# Patient Record
Sex: MALE | Race: WHITE | NOT HISPANIC OR LATINO | Employment: PART TIME | URBAN - METROPOLITAN AREA
[De-identification: names, ages, dates, MRNs, and addresses within clinical notes are randomized per-mention and may not be internally consistent; named-entity substitution may affect disease eponyms.]

---

## 2019-02-07 ENCOUNTER — HOSPITAL ENCOUNTER (EMERGENCY)
Facility: HOSPITAL | Age: 39
Discharge: HOME | End: 2019-02-07
Attending: EMERGENCY MEDICINE
Payer: MEDICAID

## 2019-02-07 VITALS
DIASTOLIC BLOOD PRESSURE: 85 MMHG | SYSTOLIC BLOOD PRESSURE: 117 MMHG | RESPIRATION RATE: 20 BRPM | OXYGEN SATURATION: 98 % | TEMPERATURE: 98 F | HEART RATE: 85 BPM

## 2019-02-07 DIAGNOSIS — R52 PAIN: ICD-10-CM

## 2019-02-07 DIAGNOSIS — S93.402A SPRAIN OF LEFT ANKLE, UNSPECIFIED LIGAMENT, INITIAL ENCOUNTER: Primary | ICD-10-CM

## 2019-02-07 PROCEDURE — 25000003 PHARM REV CODE 250: Mod: ER | Performed by: PHYSICIAN ASSISTANT

## 2019-02-07 PROCEDURE — 99283 EMERGENCY DEPT VISIT LOW MDM: CPT | Mod: ER

## 2019-02-07 RX ORDER — KETOROLAC TROMETHAMINE 10 MG/1
10 TABLET, FILM COATED ORAL EVERY 6 HOURS
Qty: 12 TABLET | Refills: 0 | Status: SHIPPED | OUTPATIENT
Start: 2019-02-07 | End: 2019-02-10

## 2019-02-07 RX ORDER — IBUPROFEN 600 MG/1
600 TABLET ORAL
Status: COMPLETED | OUTPATIENT
Start: 2019-02-07 | End: 2019-02-07

## 2019-02-07 RX ADMIN — IBUPROFEN 600 MG: 600 TABLET, FILM COATED ORAL at 07:02

## 2019-02-14 NOTE — ED PROVIDER NOTES
Encounter Date: 2/7/2019       History     Chief Complaint   Patient presents with    Ankle Pain     left ankle pain after stepping wrong while at work today.      Jonny Garza 38 y.o. male presented to the ED with c/o left ankle pain that began earlier today.  Patient reports that he had a mechanical fall while work causing foot inversion.  He states that he began with immediate swelling and pain to the lateral aspect of the ankle.  He states that this pain is exacerbated by palpation and certain movements.  He states that he has been unable to bear full weight on affected left lower extremity secondary to pain.        The history is provided by the patient.     Review of patient's allergies indicates:  No Known Allergies  History reviewed. No pertinent past medical history.  History reviewed. No pertinent surgical history.  History reviewed. No pertinent family history.  Social History     Tobacco Use    Smoking status: Current Every Day Smoker     Packs/day: 1.00    Smokeless tobacco: Never Used   Substance Use Topics    Alcohol use: No    Drug use: Yes     Types: Marijuana     Review of Systems   Musculoskeletal: Positive for arthralgias, gait problem and joint swelling. Negative for back pain.   Skin: Negative for rash.   Neurological: Negative for weakness and numbness.   Hematological: Does not bruise/bleed easily.       Physical Exam     Initial Vitals [02/07/19 2120]   BP Pulse Resp Temp SpO2   117/85 85 20 98.3 °F (36.8 °C) 98 %      MAP       --         Physical Exam    Nursing note and vitals reviewed.  Constitutional: Vital signs are normal. He appears well-developed and well-nourished. He is cooperative.  Non-toxic appearance. He does not appear ill.   HENT:   Head: Normocephalic and atraumatic.   Eyes: Conjunctivae and lids are normal.   Neck: Normal range of motion.   Cardiovascular: Normal rate.   Pulmonary/Chest: No respiratory distress.   Abdominal: Normal appearance.   Musculoskeletal:         Left ankle: He exhibits decreased range of motion and swelling. He exhibits no ecchymosis and normal pulse. Tenderness. Lateral malleolus and AITFL tenderness found. No head of 5th metatarsal tenderness found.        Feet:    Neurological: He is alert and oriented to person, place, and time. He has normal strength. No sensory deficit. GCS eye subscore is 4. GCS verbal subscore is 5. GCS motor subscore is 6.   Skin: Skin is warm, dry and intact. No rash noted. No erythema.   Psychiatric: He has a normal mood and affect. His speech is normal and behavior is normal. Thought content normal.         ED Course   Procedures  Labs Reviewed - No data to display       Imaging Results          X-Ray Ankle Complete Left (Final result)  Result time 02/07/19 19:29:35    Final result by Chai Anaya III, MD (02/07/19 19:29:35)                 Impression:      SOFT TISSUE SWELLING, GREATEST LATERALLY.  NO ACUTE BONY ABNORMALITIES SUGGESTED.      Electronically signed by: Chai Anaya MD  Date:    02/07/2019  Time:    19:29             Narrative:    EXAMINATION:  XR ANKLE COMPLETE 3 VIEW LEFT    CLINICAL HISTORY:  Pain, unspecified    COMPARISON:  None    FINDINGS:  SOFT TISSUE SWELLING, GREATEST LATERALLY.  NO ACUTE FRACTURE.  NO DISLOCATION.                            Jonny Garza 38 y.o. male presented to the ED with c/o left ankle pain that began earlier today.  Patient reports that he had a mechanical fall while work causing foot inversion.  He states that he began with immediate swelling and pain to the lateral aspect of the ankle.  He states that this pain is exacerbated by palpation and certain movements.  He states that he has been unable to bear full weight on affected left lower extremity secondary to pain.  ROS positive for left ankle pain.  Physical exam reveals patient well appearing in minimal distress due to pain. TTP of the left ankle about the lateral malleolus, AITFL and base of the 5th MT with edema  and ecchymosis  Noted. No obvious bony deformity, fibula pain or crepitus. Limited ROM of the ankle due to pain. Neurovascularly intact.     DDX: fracture, sprain, dislocation    ED management: x-ray showing no acute bony process although soft tissue swelling noted; motrin and ice in the ED. Placed patient in air splint and crutches with instructions to no weight bearing for one week and follow up with PCP should pain continue    Impression/Plan: The primary encounter diagnosis was Sprain of left ankle, unspecified ligament, initial encounter. A diagnosis of Pain was also pertinent to this visit.  Discharged with Toradol. Patient will follow up with Primary.  Patient cautioned on when to return to ED.  Pt. Understands and agrees with current treatment plan                              Clinical Impression:   The primary encounter diagnosis was Sprain of left ankle, unspecified ligament, initial encounter. A diagnosis of Pain was also pertinent to this visit.                             HEAVENLY Chase  02/14/19 2049